# Patient Record
(demographics unavailable — no encounter records)

---

## 2024-11-27 NOTE — DISCUSSION/SUMMARY
[Normal Growth] : growth [Normal Development] : development  [No Elimination Concerns] : elimination [Continue Regimen] : feeding [No Skin Concerns] : skin [Normal Sleep Pattern] : sleep [None] : no medical problems [Anticipatory Guidance Given] : Anticipatory guidance addressed as per the history of present illness section [No Vaccines] : no vaccines needed [No Medications] : ~He/She~ is not on any medications [FreeTextEntry1] : DDX Mycoplasma PND Asthma We started out trying to treat him with just Zithromax but the next day on the 27th he was actually coughing more so we added the prednisone and the albuterol.  Discussed with mom time will tell if he is an intermittent asthmatic or not.  Since we are treating him for both this time it will be difficult to ascertain.  Vision and Hearing Assessments  Lead Risk Assessment 42547  Brush teeth twice a day with soft toothbrush. Recommend visit to dentist.  Child needs to ride in a belt-positioning booster seat until  4 feet 9 inches has been reached and are between 8 and 12 years of age Use consistent, positive discipline, and mainly  use accountability over punishments. Read aloud with children before bed  Limit screen time per age appropriate. [employer] for a variety of child rearing matters, including safety  Reviewed options for receiving the appropriate amount of Fluoride potentially through diet, some toothpaste products, or purchased drinks that may unknowingly contain fluoride reviewed. [facility] does not have fluoride in its water supply, there for supplementation with fluoride may be important to promote strong enamel development. However, too much fluoride can cause fluorosis and is a different i.e.significant problem as well. Appropriate brushing for age reviewed, but it should not become a fight. Oral hygiene includes avoidance of triggers for caries such as bottles, appropriate brushing, avoiding sharing pacifiers, discontinuing pacifiers, avoiding sticky sugar based products. Annual Dental visit as directed based on age and dentition.  Multivitamins are not routinely recommended by the American Academy of Pediatrics. However, if the diet is not appropriate for age then supplementation is recommended. Options for MVI with and without fluoride reviewed.   Return for well child check in 1 year.  Normal vision and hearing in the past no family history of lazy eye across

## 2024-11-27 NOTE — PHYSICAL EXAM
[Alert] : alert [No Acute Distress] : no acute distress [Normocephalic] : normocephalic [Conjunctivae with no discharge] : conjunctivae with no discharge [PERRL] : PERRL [EOMI Bilateral] : EOMI bilateral [Auricles Well Formed] : auricles well formed [Clear Tympanic membranes with present light reflex and bony landmarks] : clear tympanic membranes with present light reflex and bony landmarks [No Discharge] : no discharge [Nares Patent] : nares patent [Pink Nasal Mucosa] : pink nasal mucosa [Palate Intact] : palate intact [Nonerythematous Oropharynx] : nonerythematous oropharynx [Supple, full passive range of motion] : supple, full passive range of motion [No Palpable Masses] : no palpable masses [Regular Rate and Rhythm] : regular rate and rhythm [Normal S1, S2 present] : normal S1, S2 present [No Murmurs] : no murmurs [+2 Femoral Pulses] : +2 femoral pulses [Soft] : soft [NonTender] : non tender [Non Distended] : non distended [Normoactive Bowel Sounds] : normoactive bowel sounds [No Hepatomegaly] : no hepatomegaly [No Splenomegaly] : no splenomegaly [Testicles Descended Bilaterally] : testicles descended bilaterally [Patent] : patent [No fissures] : no fissures [No Abnormal Lymph Nodes Palpated] : no abnormal lymph nodes palpated [No Gait Asymmetry] : no gait asymmetry [No pain or deformities with palpation of bone, muscles, joints] : no pain or deformities with palpation of bone, muscles, joints [Normal Muscle Tone] : normal muscle tone [Straight] : straight [+2 Patella DTR] : +2 patella DTR [Cranial Nerves Grossly Intact] : cranial nerves grossly intact [No Rash or Lesions] : no rash or lesions [FreeTextEntry7] : Diffuse coarse breath sounds mild wheezing patchy rales but moving air well good effort and good air entry

## 2024-11-27 NOTE — DISCUSSION/SUMMARY
[Normal Growth] : growth [Normal Development] : development  [No Elimination Concerns] : elimination [Continue Regimen] : feeding [No Skin Concerns] : skin [Normal Sleep Pattern] : sleep [None] : no medical problems [Anticipatory Guidance Given] : Anticipatory guidance addressed as per the history of present illness section [No Vaccines] : no vaccines needed [No Medications] : ~He/She~ is not on any medications [FreeTextEntry1] : DDX Mycoplasma PND Asthma We started out trying to treat him with just Zithromax but the next day on the 27th he was actually coughing more so we added the prednisone and the albuterol.  Discussed with mom time will tell if he is an intermittent asthmatic or not.  Since we are treating him for both this time it will be difficult to ascertain.  Vision and Hearing Assessments  Lead Risk Assessment 20465  Brush teeth twice a day with soft toothbrush. Recommend visit to dentist.  Child needs to ride in a belt-positioning booster seat until  4 feet 9 inches has been reached and are between 8 and 12 years of age Use consistent, positive discipline, and mainly  use accountability over punishments. Read aloud with children before bed  Limit screen time per age appropriate. [employer] for a variety of child rearing matters, including safety  Reviewed options for receiving the appropriate amount of Fluoride potentially through diet, some toothpaste products, or purchased drinks that may unknowingly contain fluoride reviewed. [facility] does not have fluoride in its water supply, there for supplementation with fluoride may be important to promote strong enamel development. However, too much fluoride can cause fluorosis and is a different i.e.significant problem as well. Appropriate brushing for age reviewed, but it should not become a fight. Oral hygiene includes avoidance of triggers for caries such as bottles, appropriate brushing, avoiding sharing pacifiers, discontinuing pacifiers, avoiding sticky sugar based products. Annual Dental visit as directed based on age and dentition.  Multivitamins are not routinely recommended by the American Academy of Pediatrics. However, if the diet is not appropriate for age then supplementation is recommended. Options for MVI with and without fluoride reviewed.   Return for well child check in 1 year.  Normal vision and hearing in the past no family history of lazy eye across

## 2024-11-27 NOTE — HISTORY OF PRESENT ILLNESS
[FreeTextEntry1] : URI's in general trigger a few weeks of cough   Has had PANS sx after certain viruses that improve on Augmentin  BUT he developed EM wirh Augmentin but now has responded to AZithromax  Mom is a nurse practitioner, she thinks he may have a mild case of asthma his coughs when he gets common colds seem to linger

## 2025-02-12 NOTE — PHYSICAL EXAM
[Acute Distress] : no acute distress [Alert] : alert [Toxic] : not toxic [EOMI] : grossly EOMI [Conjuctival Injection] : no conjunctival injection [Increased Tearing] : no increased tearing [Erythematous Oropharynx] : nonerythematous oropharynx [Clear to Auscultation Bilaterally] : clear to auscultation bilaterally [NL] : warm, clear [Warm] : warm [Clear] : clear [de-identified] : negative Kernig, negative Brudzinski's sign [de-identified] : no rash

## 2025-02-12 NOTE — DISCUSSION/SUMMARY
[FreeTextEntry1] : Pt with likely viral infection. Will send RVP for further testing. No meningitic concern today, photosensitivity and blinking may be vision issues, has upcoming visit with Ophtho for testing next month. Continue supportive care. Follow up as needed for new fever trend or worsening symptoms.

## 2025-02-12 NOTE — DISCUSSION/SUMMARY
Reports when discharged after birth Jaundice levels were borderline. Mother concerned pt appearing more yellow. Tolerating feeding well, + voids and +BM's well per mother. [FreeTextEntry1] : Pt with likely viral infection. Will send RVP for further testing. No meningitic concern today, photosensitivity and blinking may be vision issues, has upcoming visit with Ophtho for testing next month. Continue supportive care. Follow up as needed for new fever trend or worsening symptoms.

## 2025-02-12 NOTE — HISTORY OF PRESENT ILLNESS
[de-identified] : Fever, Cough [FreeTextEntry6] : Low grade fever with pallor, congestion, non productive cough, and photosensitivity. Asking for lights to be turned off at home. Mouth broke out in fever blisters. Sore throat.  Photosensitivity happening before he got sick. Only happens at random times.

## 2025-02-12 NOTE — HISTORY OF PRESENT ILLNESS
[de-identified] : Fever, Cough [FreeTextEntry6] : Low grade fever with pallor, congestion, non productive cough, and photosensitivity. Asking for lights to be turned off at home. Mouth broke out in fever blisters. Sore throat.  Photosensitivity happening before he got sick. Only happens at random times.

## 2025-02-12 NOTE — PHYSICAL EXAM
[Acute Distress] : no acute distress [Alert] : alert [Toxic] : not toxic [EOMI] : grossly EOMI [Conjuctival Injection] : no conjunctival injection [Increased Tearing] : no increased tearing [Erythematous Oropharynx] : nonerythematous oropharynx [Clear to Auscultation Bilaterally] : clear to auscultation bilaterally [NL] : warm, clear [Warm] : warm [Clear] : clear [de-identified] : negative Kernig, negative Brudzinski's sign [de-identified] : no rash